# Patient Record
(demographics unavailable — no encounter records)

---

## 2025-03-21 NOTE — HISTORY OF PRESENT ILLNESS
[FreeTextEntry8] : 27 yo female PMH anxiety/depression, mild exercise-induced asthma, and vit D insufficiency presents for acute concern.  Patient c/o increased urinary frequency and urgency. Onset about 2 months ago but states for past 1 month symptoms have worsened. Patient states she will urinate, wash her hands, and then immediately have to urinate again. Patient states she also has to urinate 3x before bed. Feels like she is not emptying her bladder fully. Has had UTI in past, this does not feel similar. Denies dysuria, hematuria, low back pain, fevers, chills, changes in pelvic pressure. Has BM every 2-3 days. Started digestion probiotic. Patient also states 2 weeks ago she had vaginal itchiness but symptoms did not persist. Denies vaginal odor.  Patient was recently diagnosed at  with HS in groin. Lesions come/go. Uses topical cream. Also feels like face and legs are puffy most mornings.  Patient c/o left heel pain. Onset a few years ago - pain comes and goes. Pain in heel worse in AM, relieves throughout day.  Insurance changed, needs new info for therapist for anxiety/depression.

## 2025-03-21 NOTE — REVIEW OF SYSTEMS
[Frequency] : frequency [Dysuria] : no dysuria [Hematuria] : no hematuria [FreeTextEntry8] : urgency [FreeTextEntry9] : L heel pain

## 2025-03-21 NOTE — PHYSICAL EXAM
[No CVA Tenderness] : no CVA  tenderness [Coordination Grossly Intact] : coordination grossly intact [No Focal Deficits] : no focal deficits [Normal Gait] : normal gait [Normal] : affect was normal and insight and judgment were intact